# Patient Record
Sex: FEMALE | Race: WHITE | NOT HISPANIC OR LATINO | Employment: PART TIME | URBAN - METROPOLITAN AREA
[De-identification: names, ages, dates, MRNs, and addresses within clinical notes are randomized per-mention and may not be internally consistent; named-entity substitution may affect disease eponyms.]

---

## 2017-07-18 ENCOUNTER — HOSPITAL ENCOUNTER (OUTPATIENT)
Dept: RADIOLOGY | Facility: HOSPITAL | Age: 57
Discharge: HOME | End: 2017-07-18
Attending: OBSTETRICS & GYNECOLOGY
Payer: COMMERCIAL

## 2017-07-18 VITALS — BODY MASS INDEX: 22.97 KG/M2 | WEIGHT: 117 LBS | HEIGHT: 60 IN

## 2017-07-18 DIAGNOSIS — Z12.31 SCREENING MAMMOGRAM, ENCOUNTER FOR: ICD-10-CM

## 2017-07-18 PROCEDURE — 77067 SCR MAMMO BI INCL CAD: CPT | Mod: TC

## 2017-07-18 PROCEDURE — 77067 SCR MAMMO BI INCL CAD: CPT | Mod: 26,,, | Performed by: RADIOLOGY

## 2017-07-18 PROCEDURE — 77063 BREAST TOMOSYNTHESIS BI: CPT | Mod: 26,,, | Performed by: RADIOLOGY

## 2017-07-21 ENCOUNTER — TELEPHONE (OUTPATIENT)
Dept: RADIOLOGY | Facility: HOSPITAL | Age: 57
End: 2017-07-21

## 2017-07-24 ENCOUNTER — HOSPITAL ENCOUNTER (OUTPATIENT)
Dept: RADIOLOGY | Facility: HOSPITAL | Age: 57
Discharge: HOME | End: 2017-07-24
Attending: OBSTETRICS & GYNECOLOGY
Payer: COMMERCIAL

## 2017-07-24 DIAGNOSIS — R92.8 ABNORMAL MAMMOGRAM, UNSPECIFIED: ICD-10-CM

## 2017-07-24 PROCEDURE — 76641 ULTRASOUND BREAST COMPLETE: CPT | Mod: 26,RT,, | Performed by: RADIOLOGY

## 2017-07-24 PROCEDURE — 76641 ULTRASOUND BREAST COMPLETE: CPT | Mod: TC,PO,RT

## 2018-09-27 ENCOUNTER — TELEPHONE (OUTPATIENT)
Dept: RADIOLOGY | Facility: HOSPITAL | Age: 58
End: 2018-09-27

## 2018-10-10 ENCOUNTER — TELEPHONE (OUTPATIENT)
Dept: RADIOLOGY | Facility: HOSPITAL | Age: 58
End: 2018-10-10

## 2018-10-11 ENCOUNTER — HOSPITAL ENCOUNTER (OUTPATIENT)
Dept: RADIOLOGY | Facility: HOSPITAL | Age: 58
Discharge: HOME | End: 2018-10-11
Attending: OBSTETRICS & GYNECOLOGY
Payer: COMMERCIAL

## 2018-10-11 VITALS — HEIGHT: 60 IN | BODY MASS INDEX: 22.97 KG/M2 | WEIGHT: 117 LBS

## 2018-10-11 DIAGNOSIS — R92.8 ABNORMAL MAMMOGRAM: ICD-10-CM

## 2018-10-11 DIAGNOSIS — D52.9: ICD-10-CM

## 2018-10-11 PROCEDURE — 76856 US EXAM PELVIC COMPLETE: CPT | Mod: 26,,, | Performed by: RADIOLOGY

## 2018-10-11 PROCEDURE — 76642 ULTRASOUND BREAST LIMITED: CPT | Mod: 26,RT,, | Performed by: RADIOLOGY

## 2018-10-11 PROCEDURE — 76642 ULTRASOUND BREAST LIMITED: CPT | Mod: TC,PO,RT

## 2018-10-11 PROCEDURE — 77062 BREAST TOMOSYNTHESIS BI: CPT | Mod: TC,PO

## 2018-10-11 PROCEDURE — 76830 TRANSVAGINAL US NON-OB: CPT | Mod: TC,PO

## 2018-10-11 PROCEDURE — 76856 US EXAM PELVIC COMPLETE: CPT | Mod: TC,PO

## 2018-10-11 PROCEDURE — 77062 BREAST TOMOSYNTHESIS BI: CPT | Mod: 26,,, | Performed by: RADIOLOGY

## 2018-10-11 PROCEDURE — 76830 TRANSVAGINAL US NON-OB: CPT | Mod: 26,,, | Performed by: RADIOLOGY

## 2018-10-11 PROCEDURE — 77066 DX MAMMO INCL CAD BI: CPT | Mod: 26,,, | Performed by: RADIOLOGY

## 2019-04-04 DIAGNOSIS — R92.8 ABNORMAL MAMMOGRAM: Primary | ICD-10-CM

## 2022-11-02 ENCOUNTER — OFFICE VISIT (OUTPATIENT)
Dept: OBSTETRICS AND GYNECOLOGY | Facility: CLINIC | Age: 62
End: 2022-11-02
Payer: COMMERCIAL

## 2022-11-02 VITALS
BODY MASS INDEX: 24.15 KG/M2 | DIASTOLIC BLOOD PRESSURE: 80 MMHG | WEIGHT: 123.69 LBS | SYSTOLIC BLOOD PRESSURE: 110 MMHG

## 2022-11-02 DIAGNOSIS — Z01.419 ENCOUNTER FOR ANNUAL ROUTINE GYNECOLOGICAL EXAMINATION: ICD-10-CM

## 2022-11-02 DIAGNOSIS — Z12.4 SCREENING FOR MALIGNANT NEOPLASM OF CERVIX: Primary | ICD-10-CM

## 2022-11-02 DIAGNOSIS — Z12.31 ENCOUNTER FOR SCREENING MAMMOGRAM FOR MALIGNANT NEOPLASM OF BREAST: ICD-10-CM

## 2022-11-02 PROCEDURE — 1159F PR MEDICATION LIST DOCUMENTED IN MEDICAL RECORD: ICD-10-PCS | Mod: CPTII,S$GLB,, | Performed by: OBSTETRICS & GYNECOLOGY

## 2022-11-02 PROCEDURE — 3008F BODY MASS INDEX DOCD: CPT | Mod: CPTII,S$GLB,, | Performed by: OBSTETRICS & GYNECOLOGY

## 2022-11-02 PROCEDURE — 99386 PREV VISIT NEW AGE 40-64: CPT | Mod: S$GLB,,, | Performed by: OBSTETRICS & GYNECOLOGY

## 2022-11-02 PROCEDURE — 3008F PR BODY MASS INDEX (BMI) DOCUMENTED: ICD-10-PCS | Mod: CPTII,S$GLB,, | Performed by: OBSTETRICS & GYNECOLOGY

## 2022-11-02 PROCEDURE — 3074F PR MOST RECENT SYSTOLIC BLOOD PRESSURE < 130 MM HG: ICD-10-PCS | Mod: CPTII,S$GLB,, | Performed by: OBSTETRICS & GYNECOLOGY

## 2022-11-02 PROCEDURE — 4010F ACE/ARB THERAPY RXD/TAKEN: CPT | Mod: CPTII,S$GLB,, | Performed by: OBSTETRICS & GYNECOLOGY

## 2022-11-02 PROCEDURE — 1159F MED LIST DOCD IN RCRD: CPT | Mod: CPTII,S$GLB,, | Performed by: OBSTETRICS & GYNECOLOGY

## 2022-11-02 PROCEDURE — 99386 PR PREVENTIVE VISIT,NEW,40-64: ICD-10-PCS | Mod: S$GLB,,, | Performed by: OBSTETRICS & GYNECOLOGY

## 2022-11-02 PROCEDURE — 3074F SYST BP LT 130 MM HG: CPT | Mod: CPTII,S$GLB,, | Performed by: OBSTETRICS & GYNECOLOGY

## 2022-11-02 PROCEDURE — 3079F PR MOST RECENT DIASTOLIC BLOOD PRESSURE 80-89 MM HG: ICD-10-PCS | Mod: CPTII,S$GLB,, | Performed by: OBSTETRICS & GYNECOLOGY

## 2022-11-02 PROCEDURE — 99999 PR PBB SHADOW E&M-NEW PATIENT-LVL III: CPT | Mod: PBBFAC,,, | Performed by: OBSTETRICS & GYNECOLOGY

## 2022-11-02 PROCEDURE — 1160F PR REVIEW ALL MEDS BY PRESCRIBER/CLIN PHARMACIST DOCUMENTED: ICD-10-PCS | Mod: CPTII,S$GLB,, | Performed by: OBSTETRICS & GYNECOLOGY

## 2022-11-02 PROCEDURE — 88175 CYTOPATH C/V AUTO FLUID REDO: CPT | Performed by: OBSTETRICS & GYNECOLOGY

## 2022-11-02 PROCEDURE — 99999 PR PBB SHADOW E&M-NEW PATIENT-LVL III: ICD-10-PCS | Mod: PBBFAC,,, | Performed by: OBSTETRICS & GYNECOLOGY

## 2022-11-02 PROCEDURE — 4010F PR ACE/ARB THEARPY RXD/TAKEN: ICD-10-PCS | Mod: CPTII,S$GLB,, | Performed by: OBSTETRICS & GYNECOLOGY

## 2022-11-02 PROCEDURE — 3079F DIAST BP 80-89 MM HG: CPT | Mod: CPTII,S$GLB,, | Performed by: OBSTETRICS & GYNECOLOGY

## 2022-11-02 PROCEDURE — 1160F RVW MEDS BY RX/DR IN RCRD: CPT | Mod: CPTII,S$GLB,, | Performed by: OBSTETRICS & GYNECOLOGY

## 2022-11-02 RX ORDER — LISINOPRIL 10 MG/1
10 TABLET ORAL DAILY
COMMUNITY
Start: 2022-10-02

## 2022-11-02 NOTE — PROGRESS NOTES
Chief Complaint   Patient presents with    [facility]       History and Physical:  Patient's last menstrual period was 2012.       Tawana Akbar is a 62 y.o.  WF who presents today for her routine annual GYN exam. The patient has no Gynecology complaints today.       Allergies:   Review of patient's allergies indicates:   Allergen Reactions    Influenza virus vaccines Swelling     Arm swelling        History reviewed. No pertinent past medical history.    History reviewed. No pertinent surgical history.    MEDS:   Current Outpatient Medications on File Prior to Visit   Medication Sig Dispense Refill    lisinopriL 10 MG tablet Take 10 mg by mouth once daily.      [DISCONTINUED] hydrochlorothiazide (HYDRODIURIL) 25 MG tablet Take 25 mg by mouth once daily.        [DISCONTINUED] hydrocodone-acetaminophen 5-325mg (NORCO) 5-325 mg per tablet Take 1 tablet by mouth every 4 (four) hours as needed for Pain. 12 tablet 0    [DISCONTINUED] ibuprofen (ADVIL,MOTRIN) 600 MG tablet Take 1 tablet (600 mg total) by mouth every 6 (six) hours as needed for Pain. 12 tablet 0    [DISCONTINUED] norethindrone-ethinyl estradiol (MICROGESTIN /20) 1-20 mg-mcg per tablet Take 1 tablet by mouth once daily.         No current facility-administered medications on file prior to visit.       OB History          4    Para   4    Term   4            AB        Living             SAB        IAB        Ectopic        Multiple        Live Births                     Social History     Socioeconomic History    Marital status:    Tobacco Use    Smoking status: Never   Substance and Sexual Activity    Alcohol use: No    Drug use: No    Sexual activity: Yes       Family History   Problem Relation Age of Onset    Ovarian cancer Mother     Breast cancer Sister          Past medical and surgical history reviewed.   I have reviewed the patient's medical history in detail and updated the computerized patient  record.        Review of System:   General: no chills, fever, night sweats, weight gain or weight loss  Psychological: no depression or suicidal ideation  Breasts: no new or changing breast lumps, nipple discharge or masses.  Respiratory: no cough, shortness of breath, or wheezing  Cardiovascular: no chest pain or dyspnea on exertion  Gastrointestinal: no abdominal pain, change in bowel habits, or black or bloody stools  Genito-Urinary: no incontinence, urinary frequency/urgency or vulvar/vaginal symptoms, pelvic pain or abnormal vaginal bleeding.  Musculoskeletal: no gait disturbance or muscular weakness      Physical Exam:   /80   Wt 56.1 kg (123 lb 10.9 oz)   LMP 01/13/2012   BMI 24.15 kg/m²   Constitutional: She appears alert and responsive. She appears well-developed, well-groomed, and well-nourished. No distress.   HENT:   Head: Normocephalic and atraumatic.   Eyes: Conjunctivae and EOM are normal. No scleral icterus.   Neck: Symmetrical. Normal range of motion. Neck supple. No tracheal deviation present. THYROID:  without masses or tenderness.  Cardiovascular: Normal rate, no rhythm abnormality noted. Extremities without swelling or edema, warm.    Pulmonary/Chest: Normal respiratory Effort. No distress or retractions. She exhibits no tenderness.  Breasts: are symmetrical.no masses    Right breast exhibits no inverted nipple, no mass, no nipple discharge, no skin change and no tenderness.   Left breast exhibits no inverted nipple, no mass, no nipple discharge, no skin change and no tenderness.  Abdominal: Soft. She exhibits no distension, hernias or masses. There is no tenderness. No enlargement of liver edge or spleen.  There is no rebound and no guarding.   Genitourinary:    External rectal exam shows no thrombosed external hemorrhoids, no lesions.     Pelvic exam was performed with patient supine.   No labial fusion, and symmetrical.    There is no rash, lesion or injury on the right labia.    There is no rash, lesion or injury on the left labia.   No bleeding and no signs of injury around the vaginal introitus, urethral meatus is normal size and without prolapse or lesions, urethra well supported. The cervix is visualized with no discharge, lesions or friability.   No vaginal discharge found.    No significant Cystocele, Enterocele or rectocele, and cervix and uterus well supported.   Bimanual exam:   The urethra is normal to palpation and there are no palpable vaginal wall masses.   Uterus is not deviated, not enlarged, not fixed, normal shape and not tender.   Cervix exhibits no motion tenderness.    Right adnexum displays no mass or nodularity and no tenderness.   Left adnexum displays no mass or nodularity and no tenderness.  Musculoskeletal: Normal range of motion.   Lymphadenopathy: No inguinal adenopathy present.   Neurological: She is alert and oriented to person, place, and time. Coordination normal.   Skin: Skin is warm and dry. She is not diaphoretic. No rashes, lesions or ulcers.   Psychiatric: She has a normal mood and affect, oriented to person, place, and time.      Assessment:   Normal annual GYN exam  1. Screening for malignant neoplasm of cervix        2. Encounter for screening mammogram for malignant neoplasm of breast        3. Encounter for annual routine gynecological examination          Dense breast hx    Plan:   PAP  Mammogram  Follow up in 1 year.  Patient informed will be contacted with results within 2 weeks. Encouraged to please call back or email if she has not heard from us by then.

## 2025-02-11 ENCOUNTER — OFFICE VISIT (OUTPATIENT)
Dept: ORTHOPEDICS | Facility: CLINIC | Age: 65
End: 2025-02-11
Payer: MEDICARE

## 2025-02-11 VITALS — WEIGHT: 123.69 LBS | HEIGHT: 60 IN | BODY MASS INDEX: 24.28 KG/M2

## 2025-02-11 DIAGNOSIS — S82.122A CLOSED FRACTURE OF LATERAL PORTION OF LEFT TIBIAL PLATEAU, INITIAL ENCOUNTER: Primary | ICD-10-CM

## 2025-02-11 PROCEDURE — 99213 OFFICE O/P EST LOW 20 MIN: CPT | Mod: PBBFAC,PO | Performed by: ORTHOPAEDIC SURGERY

## 2025-02-11 RX ORDER — HYDROCODONE BITARTRATE AND ACETAMINOPHEN 7.5; 325 MG/1; MG/1
1 TABLET ORAL EVERY 6 HOURS PRN
Qty: 33 TABLET | Refills: 0 | Status: ON HOLD | OUTPATIENT
Start: 2025-02-11 | End: 2025-02-17 | Stop reason: HOSPADM

## 2025-02-12 RX ORDER — CEFAZOLIN SODIUM 2 G/50ML
2 SOLUTION INTRAVENOUS
Status: CANCELLED | OUTPATIENT
Start: 2025-02-12

## 2025-02-12 RX ORDER — SODIUM CHLORIDE 0.9 % (FLUSH) 0.9 %
10 SYRINGE (ML) INJECTION EVERY 6 HOURS PRN
Status: SHIPPED | OUTPATIENT
Start: 2025-02-17

## 2025-02-17 PROBLEM — S82.122A CLOSED FRACTURE OF LATERAL PORTION OF LEFT TIBIAL PLATEAU: Status: ACTIVE | Noted: 2025-02-17

## 2025-02-17 PROBLEM — Z73.6 LIMITATION OF ACTIVITY DUE TO DISABILITY: Status: ACTIVE | Noted: 2025-02-17

## 2025-02-17 NOTE — H&P
Chief Complaint   Patient presents with    Left Lower Leg - Pain, Swelling, Injury       HPI:    This is a 64 y.o. who presents today complaining of left knee pain for 4 days after fall. Pain is throbbing. No numbness or tingling. No associated signs or symptoms.      History reviewed. No pertinent past medical history.   Past Surgical History:   Procedure Laterality Date    CYST REMOVAL Right     right wrist    TONSILLECTOMY      @ 2 y/o      Medications Ordered Prior to Encounter[1]   Review of patient's allergies indicates:   Allergen Reactions    Influenza virus vaccines Swelling     Arm swelling       Family History not pertinent   Social History[2]      Review of Systems:   Constitutional:  Denies fever or chills    Eyes:  Denies change in visual acuity    HENT:  Denies nasal congestion or sore throat    Respiratory:  Denies cough or shortness of breath    Cardiovascular:  Denies chest pain or edema    GI:  Denies abdominal pain, nausea, vomiting, bloody stools or diarrhea    :  Denies dysuria    Integument:  Denies rash    Neurologic:  Denies headache, focal weakness or sensory changes    Endocrine:  Denies polyuria or polydipsia    Lymphatic:  Denies swollen glands    Psychiatric:  Denies depression or anxiety       Physical Exam:    Constitutional:  Well developed, well nourished, no acute distress, non-toxic appearance    Integument:  Well hydrated, no rash    Lymphatic:  No lymphadenopathy noted    Neurologic:  Alert & oriented x 3,     Psychiatric:  Speech and behavior appropriate    Gi: abdomen soft  Eyes: EOMI   R knee  Exam performed same as contralateral side and is normal  L knee  No ROM due to pain. Overall normal alignment. Skin intact. Compartments soft. NVI distally.      X-rays were performed today, personally reviewed by me and findings discussed with the patient.   3 views of the left knee show comminuted tibial plateau fracture      Closed fracture of lateral portion of left tibial  plateau, initial encounter  -     HYDROcodone-acetaminophen (NORCO) 7.5-325 mg per tablet; Take 1 tablet by mouth every 6 (six) hours as needed for Pain.  Dispense: 33 tablet; Refill: 0  -     CBC Auto Differential; Future; Expected date: 02/11/2025  -     Basic Metabolic Panel; Future; Expected date: 02/11/2025  -     Vital signs; Standing  -     Diet NPO; Standing  -     Place DARYL hose; Standing  -     Place sequential compression device; Standing  -     Case Request Operating Room: ORIF, FRACTURE, TIBIA, PLATEAU  -     Admit to Inpatient; Standing    Other orders  -     sodium chloride 0.9% flush 10 mL  -     IP VTE LOW RISK PATIENT; Standing  -     tranexamic acid (CYKLOKAPRON) 1,000 mg in 0.9% NaCl 100 mL IVPB (MB+)  -     cefazolin (ANCEF) 2 gram in dextrose 5% 50 mL IVPB (premix)          I had a long discussion with the patient regarding the benefits and risks of left tibial plateau ORIF. They voiced understanding and wish to proceed with surgery. Consents signed in clinic.            [1]   No current facility-administered medications on file prior to visit.     Current Outpatient Medications on File Prior to Visit   Medication Sig Dispense Refill    naproxen (NAPROSYN) 500 MG tablet Take 1 tablet (500 mg total) by mouth 2 (two) times daily with meals. 60 tablet 0    ondansetron (ZOFRAN-ODT) 4 MG TbDL Take 1 tablet (4 mg total) by mouth every 8 (eight) hours as needed (nausea). 10 tablet 0    oxyCODONE-acetaminophen (PERCOCET) 5-325 mg per tablet Take 1 tablet by mouth every 6 (six) hours as needed for Pain. 20 tablet 0   [2]   Social History  Socioeconomic History    Marital status:    Tobacco Use    Smoking status: Never     Passive exposure: Never   Substance and Sexual Activity    Alcohol use: No    Drug use: No    Sexual activity: Yes

## 2025-02-26 DIAGNOSIS — M25.562 LEFT KNEE PAIN, UNSPECIFIED CHRONICITY: Primary | ICD-10-CM

## 2025-03-06 ENCOUNTER — OFFICE VISIT (OUTPATIENT)
Dept: ORTHOPEDICS | Facility: CLINIC | Age: 65
End: 2025-03-06
Payer: MEDICARE

## 2025-03-06 ENCOUNTER — HOSPITAL ENCOUNTER (OUTPATIENT)
Dept: RADIOLOGY | Facility: HOSPITAL | Age: 65
Discharge: HOME | End: 2025-03-06
Attending: NURSE PRACTITIONER
Payer: MEDICARE

## 2025-03-06 VITALS
HEIGHT: 60 IN | BODY MASS INDEX: 23.55 KG/M2 | WEIGHT: 119.94 LBS | DIASTOLIC BLOOD PRESSURE: 89 MMHG | SYSTOLIC BLOOD PRESSURE: 164 MMHG | HEART RATE: 90 BPM

## 2025-03-06 DIAGNOSIS — S82.122A CLOSED FRACTURE OF LATERAL PORTION OF LEFT TIBIAL PLATEAU, INITIAL ENCOUNTER: Primary | ICD-10-CM

## 2025-03-06 DIAGNOSIS — M25.562 LEFT KNEE PAIN, UNSPECIFIED CHRONICITY: ICD-10-CM

## 2025-03-06 DIAGNOSIS — Z87.81 S/P ORIF (OPEN REDUCTION INTERNAL FIXATION) FRACTURE: ICD-10-CM

## 2025-03-06 DIAGNOSIS — Z98.890 S/P ORIF (OPEN REDUCTION INTERNAL FIXATION) FRACTURE: ICD-10-CM

## 2025-03-06 PROCEDURE — 99024 POSTOP FOLLOW-UP VISIT: CPT | Mod: POP,,, | Performed by: NURSE PRACTITIONER

## 2025-03-06 PROCEDURE — 99999 PR PBB SHADOW E&M-EST. PATIENT-LVL III: CPT | Mod: PBBFAC,,, | Performed by: NURSE PRACTITIONER

## 2025-03-06 PROCEDURE — 73560 X-RAY EXAM OF KNEE 1 OR 2: CPT | Mod: TC,PO,LT

## 2025-03-06 PROCEDURE — 99213 OFFICE O/P EST LOW 20 MIN: CPT | Mod: PBBFAC,25,PO | Performed by: NURSE PRACTITIONER

## 2025-03-06 PROCEDURE — 73560 X-RAY EXAM OF KNEE 1 OR 2: CPT | Mod: 26,LT,, | Performed by: RADIOLOGY

## 2025-03-06 RX ORDER — METHOCARBAMOL 750 MG/1
750 TABLET, FILM COATED ORAL 4 TIMES DAILY PRN
Qty: 40 TABLET | Refills: 2 | Status: SHIPPED | OUTPATIENT
Start: 2025-03-06 | End: 2025-04-05

## 2025-03-06 NOTE — PROGRESS NOTES
Chief Complaint   Patient presents with    Left Knee - Pain       HPI:  64 y.o. returns to clinic today status post ORIF left tibia plateau 2 weeks ago. Pain is intermittent. Patient is compliant most of the time with restrictions.     Left tibia  Incision intact with staples  No redness. Scant drainage.   Neurovascularly intact to LLE.     X-rays were performed today, personally reviewed by me and findings discussed with the patient.  2 views of the left tibia show hardware in place with good alignment.      Closed fracture of lateral portion of left tibial plateau, initial encounter  -     methocarbamoL (ROBAXIN) 750 MG Tab; Take 1 tablet (750 mg total) by mouth 4 (four) times daily as needed (muscle spasms).  Dispense: 40 tablet; Refill: 2    S/P ORIF (open reduction internal fixation) fracture  -     methocarbamoL (ROBAXIN) 750 MG Tab; Take 1 tablet (750 mg total) by mouth 4 (four) times daily as needed (muscle spasms).  Dispense: 40 tablet; Refill: 2        Remove staples and place steri strips today.   Continue NWB status.   Return to clinic in 4 weeks with Dr. Fischer.

## 2025-03-07 ENCOUNTER — TELEPHONE (OUTPATIENT)
Dept: ORTHOPEDICS | Facility: CLINIC | Age: 65
End: 2025-03-07
Payer: MEDICARE

## 2025-03-07 NOTE — TELEPHONE ENCOUNTER
----- Message from Kraig sent at 3/6/2025  4:41 PM CST -----  Patient states that her appointment on 04/22/25 is supposed to be a 4 week fuPlease call  [phone]

## 2025-03-07 NOTE — TELEPHONE ENCOUNTER
Called pt/ scheduled a sooner appt/ she said she will need xrays prior to her appt with Dr. Fischer and she wanted her appointment in April moved up due to her appt was more than 4 weeks away/ She also wanted to send a review on Rylee the MA that took her staples out/ she was very nice and took great care of her and she wanted to send her a message to say thank you very much. No orders are in for a xray/do I put one in? Please advise

## 2025-03-31 DIAGNOSIS — S82.122A CLOSED FRACTURE OF LATERAL PORTION OF LEFT TIBIAL PLATEAU, INITIAL ENCOUNTER: Primary | ICD-10-CM

## 2025-04-08 ENCOUNTER — HOSPITAL ENCOUNTER (OUTPATIENT)
Dept: RADIOLOGY | Facility: HOSPITAL | Age: 65
Discharge: HOME | End: 2025-04-08
Attending: ORTHOPAEDIC SURGERY
Payer: MEDICARE

## 2025-04-08 ENCOUNTER — OFFICE VISIT (OUTPATIENT)
Dept: ORTHOPEDICS | Facility: CLINIC | Age: 65
End: 2025-04-08
Payer: MEDICARE

## 2025-04-08 VITALS — HEIGHT: 60 IN | WEIGHT: 119.94 LBS | BODY MASS INDEX: 23.55 KG/M2

## 2025-04-08 DIAGNOSIS — S82.122A CLOSED FRACTURE OF LATERAL PORTION OF LEFT TIBIAL PLATEAU, INITIAL ENCOUNTER: Primary | ICD-10-CM

## 2025-04-08 DIAGNOSIS — S82.122A CLOSED FRACTURE OF LATERAL PORTION OF LEFT TIBIAL PLATEAU, INITIAL ENCOUNTER: ICD-10-CM

## 2025-04-08 PROCEDURE — 1101F PT FALLS ASSESS-DOCD LE1/YR: CPT | Mod: CPTII,S$GLB,, | Performed by: ORTHOPAEDIC SURGERY

## 2025-04-08 PROCEDURE — 73560 X-RAY EXAM OF KNEE 1 OR 2: CPT | Mod: 26,59,RT, | Performed by: RADIOLOGY

## 2025-04-08 PROCEDURE — 1159F MED LIST DOCD IN RCRD: CPT | Mod: CPTII,S$GLB,, | Performed by: ORTHOPAEDIC SURGERY

## 2025-04-08 PROCEDURE — 73562 X-RAY EXAM OF KNEE 3: CPT | Mod: TC,PO,LT

## 2025-04-08 PROCEDURE — 99999 PR PBB SHADOW E&M-EST. PATIENT-LVL III: CPT | Mod: PBBFAC,,, | Performed by: ORTHOPAEDIC SURGERY

## 2025-04-08 PROCEDURE — 3288F FALL RISK ASSESSMENT DOCD: CPT | Mod: CPTII,S$GLB,, | Performed by: ORTHOPAEDIC SURGERY

## 2025-04-08 PROCEDURE — 1160F RVW MEDS BY RX/DR IN RCRD: CPT | Mod: CPTII,S$GLB,, | Performed by: ORTHOPAEDIC SURGERY

## 2025-04-08 PROCEDURE — 1125F AMNT PAIN NOTED PAIN PRSNT: CPT | Mod: CPTII,S$GLB,, | Performed by: ORTHOPAEDIC SURGERY

## 2025-04-08 PROCEDURE — 99024 POSTOP FOLLOW-UP VISIT: CPT | Mod: S$GLB,,, | Performed by: ORTHOPAEDIC SURGERY

## 2025-04-08 PROCEDURE — 73562 X-RAY EXAM OF KNEE 3: CPT | Mod: 26,LT,, | Performed by: RADIOLOGY

## 2025-04-08 RX ORDER — METHOCARBAMOL 750 MG/1
750 TABLET, FILM COATED ORAL 4 TIMES DAILY PRN
Qty: 44 TABLET | Refills: 3 | Status: SHIPPED | OUTPATIENT
Start: 2025-04-08

## 2025-04-08 RX ORDER — METHOCARBAMOL 500 MG/1
TABLET, FILM COATED ORAL 4 TIMES DAILY
COMMUNITY
End: 2025-04-08

## 2025-04-15 NOTE — PROGRESS NOTES
Chief Complaint   Patient presents with    Left Knee - Post-op Evaluation       HPI:  64 y.o. returns to clinic today status post  left tibial plateau ORIF 6 weeks ago. Pain is mild. Patient is compliant most of the time with restrictions.     L knee    Incision healed. No erythema or fluctuance. Overall normal alignment. Mild point TTP about the fracture site. Decreased ROM due to stiffness. Compartments soft. Skin intact. NVI distally.      X-rays were performed today, personally reviewed by me and findings discussed with the patient.  3 views of the left knee show hardware intact in good position    Closed fracture of lateral portion of left tibial plateau, initial encounter    Other orders  -     methocarbamoL (ROBAXIN) 750 MG Tab; Take 1 tablet (750 mg total) by mouth 4 (four) times daily as needed.  Dispense: 44 tablet; Refill: 3        Continue ROM. Begin PT. Advance WB. RTC 4 weeks.

## 2025-04-17 ENCOUNTER — TELEPHONE (OUTPATIENT)
Dept: ORTHOPEDICS | Facility: CLINIC | Age: 65
End: 2025-04-17
Payer: MEDICARE

## 2025-04-17 NOTE — TELEPHONE ENCOUNTER
----- Message from Kraig sent at 4/17/2025  4:23 PM CDT -----  Patient states that she was told on 04/08/25 to come back in 4 weeks but she is scheduled on 06/10/25 Soonest is 06/05/25Please call to [phone]

## 2025-04-23 ENCOUNTER — TELEPHONE (OUTPATIENT)
Dept: ORTHOPEDICS | Facility: CLINIC | Age: 65
End: 2025-04-23
Payer: MEDICARE

## 2025-04-23 DIAGNOSIS — S82.122A CLOSED FRACTURE OF LATERAL PORTION OF LEFT TIBIAL PLATEAU, INITIAL ENCOUNTER: Primary | ICD-10-CM

## 2025-04-23 DIAGNOSIS — Z87.81 S/P ORIF (OPEN REDUCTION INTERNAL FIXATION) FRACTURE: ICD-10-CM

## 2025-04-23 DIAGNOSIS — Z98.890 S/P ORIF (OPEN REDUCTION INTERNAL FIXATION) FRACTURE: ICD-10-CM

## 2025-04-23 NOTE — TELEPHONE ENCOUNTER
----- Message from Kraig sent at 4/23/2025  8:02 AM CDT -----  Patient would like a callback regarding needing a referral for [facility] in [facility]--[phone]  No faxCaller [phone]

## 2025-05-12 DIAGNOSIS — M25.562 ACUTE PAIN OF LEFT KNEE: Primary | ICD-10-CM

## 2025-05-15 ENCOUNTER — HOSPITAL ENCOUNTER (OUTPATIENT)
Dept: RADIOLOGY | Facility: HOSPITAL | Age: 65
Discharge: HOME | End: 2025-05-15
Attending: ORTHOPAEDIC SURGERY
Payer: MEDICARE

## 2025-05-15 ENCOUNTER — OFFICE VISIT (OUTPATIENT)
Dept: ORTHOPEDICS | Facility: CLINIC | Age: 65
End: 2025-05-15
Payer: MEDICARE

## 2025-05-15 VITALS — HEIGHT: 60 IN | WEIGHT: 119.06 LBS | BODY MASS INDEX: 23.37 KG/M2

## 2025-05-15 DIAGNOSIS — M25.562 ACUTE PAIN OF LEFT KNEE: ICD-10-CM

## 2025-05-15 DIAGNOSIS — S82.122A CLOSED FRACTURE OF LATERAL PORTION OF LEFT TIBIAL PLATEAU, INITIAL ENCOUNTER: Primary | ICD-10-CM

## 2025-05-15 PROCEDURE — 99024 POSTOP FOLLOW-UP VISIT: CPT | Mod: POP,,, | Performed by: ORTHOPAEDIC SURGERY

## 2025-05-15 PROCEDURE — 73560 X-RAY EXAM OF KNEE 1 OR 2: CPT | Mod: TC,PO,RT

## 2025-05-15 PROCEDURE — 99999 PR PBB SHADOW E&M-EST. PATIENT-LVL III: CPT | Mod: PBBFAC,,, | Performed by: ORTHOPAEDIC SURGERY

## 2025-05-15 PROCEDURE — 99213 OFFICE O/P EST LOW 20 MIN: CPT | Mod: PBBFAC,25,PO | Performed by: ORTHOPAEDIC SURGERY

## 2025-05-27 NOTE — PROGRESS NOTES
Chief Complaint   Patient presents with    Left Knee - Post-op Evaluation       HPI:  64 y.o. returns to clinic today status post  left tibial plateau ORIF 10 weeks ago. Pain is mild. Patient is compliant most of the time with restrictions.     L knee  ROM 2-115. Stable to stress. Incision healed. NVI distally.     X-rays were performed today, personally reviewed by me and findings discussed with the patient.  3 views of the left knee show implants intact with good maintenance of reduction    Closed fracture of lateral portion of left tibial plateau, initial encounter        Continue PT. RTC 2 months.

## 2025-06-02 DIAGNOSIS — M25.562 LEFT KNEE PAIN, UNSPECIFIED CHRONICITY: Primary | ICD-10-CM

## 2025-07-17 ENCOUNTER — OFFICE VISIT (OUTPATIENT)
Dept: ORTHOPEDICS | Facility: CLINIC | Age: 65
End: 2025-07-17
Payer: MEDICARE

## 2025-07-17 VITALS — BODY MASS INDEX: 23.37 KG/M2 | WEIGHT: 119.06 LBS | HEIGHT: 60 IN

## 2025-07-17 DIAGNOSIS — S82.122A CLOSED FRACTURE OF LATERAL PORTION OF LEFT TIBIAL PLATEAU, INITIAL ENCOUNTER: Primary | ICD-10-CM

## 2025-07-17 PROCEDURE — 1101F PT FALLS ASSESS-DOCD LE1/YR: CPT | Mod: CPTII,S$GLB,, | Performed by: ORTHOPAEDIC SURGERY

## 2025-07-17 PROCEDURE — 99214 OFFICE O/P EST MOD 30 MIN: CPT | Mod: S$GLB,,, | Performed by: ORTHOPAEDIC SURGERY

## 2025-07-17 PROCEDURE — 3008F BODY MASS INDEX DOCD: CPT | Mod: CPTII,S$GLB,, | Performed by: ORTHOPAEDIC SURGERY

## 2025-07-17 PROCEDURE — 3288F FALL RISK ASSESSMENT DOCD: CPT | Mod: CPTII,S$GLB,, | Performed by: ORTHOPAEDIC SURGERY

## 2025-07-17 PROCEDURE — 1159F MED LIST DOCD IN RCRD: CPT | Mod: CPTII,S$GLB,, | Performed by: ORTHOPAEDIC SURGERY

## 2025-07-17 PROCEDURE — 99999 PR PBB SHADOW E&M-EST. PATIENT-LVL III: CPT | Mod: PBBFAC,,, | Performed by: ORTHOPAEDIC SURGERY

## 2025-07-17 PROCEDURE — 1160F RVW MEDS BY RX/DR IN RCRD: CPT | Mod: CPTII,S$GLB,, | Performed by: ORTHOPAEDIC SURGERY

## 2025-07-17 PROCEDURE — 1126F AMNT PAIN NOTED NONE PRSNT: CPT | Mod: CPTII,S$GLB,, | Performed by: ORTHOPAEDIC SURGERY

## 2025-07-24 NOTE — PROGRESS NOTES
Chief Complaint   Patient presents with    Left Knee - Pain       HPI:   This is a 64 y.o. who returns today status post left tibial plateau ORIF 4 months ago. Patient has been FWB.  Pain is dull. No numbness or tingling. No associated signs or symptoms.    History reviewed. No pertinent past medical history.  Past Surgical History:   Procedure Laterality Date    CYST REMOVAL Right     right wrist    OPEN REDUCTION AND INTERNAL FIXATION (ORIF) OF FRACTURE OF TIBIAL PLATEAU Left 2/17/2025    Procedure: ORIF, FRACTURE, TIBIA, PLATEAU;  Surgeon: Luciano Fischer MD;  Location: [facility];  Service: Orthopedics;  Laterality: Left;    TONSILLECTOMY      @ 4 y/o     Medications Ordered Prior to Encounter[1]  Review of patient's allergies indicates:   Allergen Reactions    Influenza virus vaccines Swelling     Arm swelling      Family History   Problem Relation Name Age of Onset    Ovarian cancer Mother      Parkinsonism Father      Breast cancer Sister       Social History[2]    Review of Systems:  Constitutional:  Denies fever or chills   Eyes:  Denies change in visual acuity   HENT:  Denies nasal congestion or sore throat   Respiratory:  Denies cough or shortness of breath   Cardiovascular:  Denies chest pain or edema   GI:  Denies abdominal pain, nausea, vomiting, bloody stools or diarrhea   :  Denies dysuria   Integument:  Denies rash   Neurologic:  Denies headache, focal weakness or sensory changes   Endocrine:  Denies polyuria or polydipsia   Lymphatic:  Denies swollen glands   Psychiatric:  Denies depression or anxiety     Physical Exam:   Constitutional:  Well developed, well nourished, no acute distress, non-toxic appearance   Integument:  Well hydrated, no rash   Lymphatic:  No lymphadenopathy noted   Neurologic:  Alert & oriented x 3, CN 2-12 normal, normal motor function, normal sensory function, no focal deficits noted   Psychiatric:  Speech and behavior appropriate   Gi: abdomen soft  Eyes: EOMI    R  knee  Exam performed same as contralateral side and is normal  L knee  FROM. Stable to stress. Skin intact. Compartments soft. NVI distally.     X-rays were performed today, personally reviewed by me and findings discussed with the patient.  2 views of the left tibia show healed fracture in good position      Closed fracture of lateral portion of left tibial plateau, initial encounter        Continue as tolerated. RTC as needed.            [1]   Current Outpatient Medications on File Prior to Visit   Medication Sig Dispense Refill    methocarbamoL (ROBAXIN) 750 MG Tab Take 1 tablet (750 mg total) by mouth 4 (four) times daily as needed. 44 tablet 3    ondansetron (ZOFRAN-ODT) 4 MG TbDL Take 1 tablet (4 mg total) by mouth every 8 (eight) hours as needed (nausea). 10 tablet 0    oxyCODONE (ROXICODONE) 5 MG immediate release tablet Take 1 tablet (5 mg total) by mouth every 4 (four) hours as needed. 33 tablet 0    traMADoL (ULTRAM) 50 mg tablet Take 1 tablet (50 mg total) by mouth every 4 (four) hours as needed for Pain. 44 tablet 0     Current Facility-Administered Medications on File Prior to Visit   Medication Dose Route Frequency Provider Last Rate Last Admin    sodium chloride 0.9% flush 10 mL  10 mL Intravenous Q6H PRN Luciano Fischer MD       [2]   Social History  Socioeconomic History    Marital status:    Tobacco Use    Smoking status: Never     Passive exposure: Never   Substance and Sexual Activity    Alcohol use: No    Drug use: No    Sexual activity: Yes